# Patient Record
Sex: FEMALE | Race: WHITE | URBAN - METROPOLITAN AREA
[De-identification: names, ages, dates, MRNs, and addresses within clinical notes are randomized per-mention and may not be internally consistent; named-entity substitution may affect disease eponyms.]

---

## 2017-02-03 ENCOUNTER — HISTORICAL (OUTPATIENT)
Dept: ENDOSCOPY | Facility: HOSPITAL | Age: 57
End: 2017-02-03

## 2017-03-07 ENCOUNTER — HISTORICAL (OUTPATIENT)
Dept: FAMILY MEDICINE | Facility: CLINIC | Age: 57
End: 2017-03-07

## 2017-12-18 ENCOUNTER — HOSPITAL ENCOUNTER (OUTPATIENT)
Dept: INTENSIVE CARE | Facility: HOSPITAL | Age: 57
End: 2017-12-19
Attending: INTERNAL MEDICINE | Admitting: INTERNAL MEDICINE

## 2017-12-18 LAB
ABS NEUT (OLG): 4.38 X10(3)/MCL (ref 2.1–9.2)
ALBUMIN SERPL-MCNC: 3.6 GM/DL (ref 3.4–5)
ALBUMIN/GLOB SERPL: 0.8 {RATIO}
ALP SERPL-CCNC: 111 UNIT/L (ref 38–126)
ALT SERPL-CCNC: 37 UNIT/L (ref 12–78)
AMMONIA PLAS-MSCNC: 89 MCMOL/L (ref 11–32)
AMPHET UR QL SCN: NORMAL
APAP SERPL-MCNC: <2 MCG/ML (ref 10–30)
APPEARANCE, UA: ABNORMAL
APTT PPP: 24.6 SECOND(S) (ref 24.8–36.9)
AST SERPL-CCNC: 140 UNIT/L (ref 15–37)
BACTERIA SPEC CULT: ABNORMAL /HPF
BARBITURATE SCN PRESENT UR: NORMAL
BASOPHILS # BLD AUTO: 0.1 X10(3)/MCL (ref 0–0.2)
BASOPHILS NFR BLD AUTO: 1 %
BENZODIAZ UR QL SCN: NORMAL
BILIRUB SERPL-MCNC: 2.2 MG/DL (ref 0.2–1)
BILIRUB UR QL STRIP: ABNORMAL
BILIRUBIN DIRECT+TOT PNL SERPL-MCNC: 0.9 MG/DL (ref 0–0.5)
BILIRUBIN DIRECT+TOT PNL SERPL-MCNC: 1.3 MG/DL (ref 0–0.8)
BUN SERPL-MCNC: 6 MG/DL (ref 7–18)
CALCIUM SERPL-MCNC: 8.5 MG/DL (ref 8.5–10.1)
CANNABINOIDS UR QL SCN: NORMAL
CHLORIDE SERPL-SCNC: 95 MMOL/L (ref 98–107)
CK SERPL-CCNC: 160 UNIT/L (ref 26–192)
CO2 SERPL-SCNC: 11 MMOL/L (ref 21–32)
COCAINE UR QL SCN: NORMAL
COLOR UR: ABNORMAL
CREAT SERPL-MCNC: 0.96 MG/DL (ref 0.55–1.02)
EOSINOPHIL # BLD AUTO: 0 X10(3)/MCL (ref 0–0.9)
EOSINOPHIL NFR BLD AUTO: 0 %
ERYTHROCYTE [DISTWIDTH] IN BLOOD BY AUTOMATED COUNT: 17.3 % (ref 11.5–17)
ETHANOL SERPL-MCNC: 242 MG/DL (ref 0–3)
GLOBULIN SER-MCNC: 4.6 GM/DL (ref 2.4–3.5)
GLUCOSE (UA): NEGATIVE
GLUCOSE SERPL-MCNC: 89 MG/DL (ref 74–106)
HCT VFR BLD AUTO: 35.3 % (ref 37–47)
HGB BLD-MCNC: 10.5 GM/DL (ref 12–16)
HGB UR QL STRIP: ABNORMAL
INR PPP: 1.25 (ref 0–1.27)
KETONES UR QL STRIP: ABNORMAL
LEUKOCYTE ESTERASE UR QL STRIP: NEGATIVE
LYMPHOCYTES # BLD AUTO: 0.7 X10(3)/MCL (ref 0.6–4.6)
LYMPHOCYTES NFR BLD AUTO: 12 %
MCH RBC QN AUTO: 28.1 PG (ref 27–31)
MCHC RBC AUTO-ENTMCNC: 29.7 GM/DL (ref 33–36)
MCV RBC AUTO: 94.4 FL (ref 80–94)
MONOCYTES # BLD AUTO: 0.6 X10(3)/MCL (ref 0.1–1.3)
MONOCYTES NFR BLD AUTO: 10 %
NEUTROPHILS # BLD AUTO: 4.38 X10(3)/MCL (ref 1.4–7.9)
NEUTROPHILS NFR BLD AUTO: 76 %
NITRITE UR QL STRIP: NEGATIVE
OPIATES UR QL SCN: NORMAL
PCP UR QL: NORMAL
PH UR STRIP.AUTO: 6 [PH] (ref 5–7.5)
PH UR STRIP: 6 [PH] (ref 5–9)
PLATELET # BLD AUTO: 164 X10(3)/MCL (ref 130–400)
PMV BLD AUTO: 11.3 FL (ref 9.4–12.4)
POC TROPONIN: 0 NG/ML (ref 0–0.08)
POTASSIUM SERPL-SCNC: 4 MMOL/L (ref 3.5–5.1)
PROT SERPL-MCNC: 8.2 GM/DL (ref 6.4–8.2)
PROT UR QL STRIP: ABNORMAL
PROTHROMBIN TIME: 16.1 SECOND(S) (ref 12.2–14.7)
RBC # BLD AUTO: 3.74 X10(6)/MCL (ref 4.2–5.4)
RBC #/AREA URNS HPF: 15 /HPF (ref 0–2)
SALICYLATES SERPL-MCNC: 1.7 MG/DL (ref 2.8–20)
SODIUM SERPL-SCNC: 134 MMOL/L (ref 136–145)
SP GR FLD REFRACTOMETRY: 1.02 (ref 1–1.03)
SP GR UR STRIP: 1.02 (ref 1–1.03)
SQUAMOUS EPITHELIAL, UA: ABNORMAL
UROBILINOGEN UR STRIP-ACNC: 1
WBC # SPEC AUTO: 5.8 X10(3)/MCL (ref 4.5–11.5)
WBC #/AREA URNS HPF: 7 /HPF (ref 0–3)

## 2017-12-19 LAB
ABS NEUT (OLG): 3.91 X10(3)/MCL (ref 2.1–9.2)
APTT PPP: 29.9 SECOND(S) (ref 24.8–36.9)
BASOPHILS # BLD AUTO: 0 X10(3)/MCL (ref 0–0.2)
BASOPHILS NFR BLD AUTO: 1 %
BUN SERPL-MCNC: 3 MG/DL (ref 7–18)
CALCIUM SERPL-MCNC: 7.7 MG/DL (ref 8.5–10.1)
CHLORIDE SERPL-SCNC: 103 MMOL/L (ref 98–107)
CO2 SERPL-SCNC: 16 MMOL/L (ref 21–32)
CREAT SERPL-MCNC: 0.7 MG/DL (ref 0.55–1.02)
EOSINOPHIL # BLD AUTO: 0 X10(3)/MCL (ref 0–0.9)
EOSINOPHIL NFR BLD AUTO: 0 %
ERYTHROCYTE [DISTWIDTH] IN BLOOD BY AUTOMATED COUNT: 17.4 % (ref 11.5–17)
GLUCOSE SERPL-MCNC: 75 MG/DL (ref 74–106)
HCT VFR BLD AUTO: 27.1 % (ref 37–47)
HGB BLD-MCNC: 8.3 GM/DL (ref 12–16)
INR PPP: 1.31 (ref 0–1.27)
LYMPHOCYTES # BLD AUTO: 1.2 X10(3)/MCL (ref 0.6–4.6)
LYMPHOCYTES NFR BLD AUTO: 19 %
MCH RBC QN AUTO: 28.4 PG (ref 27–31)
MCHC RBC AUTO-ENTMCNC: 30.6 GM/DL (ref 33–36)
MCV RBC AUTO: 92.8 FL (ref 80–94)
MONOCYTES # BLD AUTO: 0.8 X10(3)/MCL (ref 0.1–1.3)
MONOCYTES NFR BLD AUTO: 13 %
NEUTROPHILS # BLD AUTO: 3.91 X10(3)/MCL (ref 2.1–9.2)
NEUTROPHILS NFR BLD AUTO: 66 %
PLATELET # BLD AUTO: 112 X10(3)/MCL (ref 130–400)
PMV BLD AUTO: 10.5 FL (ref 9.4–12.4)
POTASSIUM SERPL-SCNC: 3 MMOL/L (ref 3.5–5.1)
PROTHROMBIN TIME: 16.7 SECOND(S) (ref 12.2–14.7)
RBC # BLD AUTO: 2.92 X10(6)/MCL (ref 4.2–5.4)
SODIUM SERPL-SCNC: 136 MMOL/L (ref 136–145)
WBC # SPEC AUTO: 6 X10(3)/MCL (ref 4.5–11.5)

## 2018-01-30 ENCOUNTER — HISTORICAL (OUTPATIENT)
Dept: FAMILY MEDICINE | Facility: CLINIC | Age: 58
End: 2018-01-30

## 2018-01-30 LAB
AMPHET UR QL SCN: NEGATIVE
BARBITURATE SCN PRESENT UR: NEGATIVE
BENZODIAZ UR QL SCN: NEGATIVE
CANNABINOIDS UR QL SCN: NEGATIVE
COCAINE UR QL SCN: NEGATIVE
ETHANOL SERPL-MCNC: <3 MG/DL
OPIATES UR QL SCN: NEGATIVE
PCP UR QL: NEGATIVE
PH UR STRIP.AUTO: 6 [PH] (ref 5–8)
TEMPERATURE, URINE (OHS): 21.4 DEGC (ref 20–25)

## 2018-02-01 ENCOUNTER — HISTORICAL (OUTPATIENT)
Dept: ADMINISTRATIVE | Facility: HOSPITAL | Age: 58
End: 2018-02-01

## 2018-02-01 LAB
ABS NEUT (OLG): 3.17 X10(3)/MCL (ref 2.1–9.2)
ALBUMIN SERPL-MCNC: 3.8 GM/DL (ref 3.4–5)
ALBUMIN/GLOB SERPL: 1 RATIO (ref 1–2)
ALP SERPL-CCNC: 73 UNIT/L (ref 45–117)
ALT SERPL-CCNC: 23 UNIT/L (ref 12–78)
AST SERPL-CCNC: 30 UNIT/L (ref 15–37)
BASOPHILS # BLD AUTO: 0.09 X10(3)/MCL
BASOPHILS NFR BLD AUTO: 1 % (ref 0–1)
BILIRUB SERPL-MCNC: 0.6 MG/DL (ref 0.2–1)
BILIRUBIN DIRECT+TOT PNL SERPL-MCNC: 0.3 MG/DL
BILIRUBIN DIRECT+TOT PNL SERPL-MCNC: 0.3 MG/DL
BUN SERPL-MCNC: 9 MG/DL (ref 7–18)
CALCIUM SERPL-MCNC: 9.3 MG/DL (ref 8.5–10.1)
CHLORIDE SERPL-SCNC: 106 MMOL/L (ref 98–107)
CHOLEST SERPL-MCNC: 176 MG/DL
CHOLEST/HDLC SERPL: 2.6 {RATIO} (ref 0–4.4)
CO2 SERPL-SCNC: 28 MMOL/L (ref 21–32)
CREAT SERPL-MCNC: 0.8 MG/DL (ref 0.6–1.3)
DEPRECATED CALCIDIOL+CALCIFEROL SERPL-MC: 30.01 NG/ML (ref 30–80)
EOSINOPHIL # BLD AUTO: 0.29 X10(3)/MCL
EOSINOPHIL NFR BLD AUTO: 4 % (ref 0–5)
ERYTHROCYTE [DISTWIDTH] IN BLOOD BY AUTOMATED COUNT: 19.2 % (ref 11.5–14.5)
EST. AVERAGE GLUCOSE BLD GHB EST-MCNC: 128 MG/DL
FERRITIN SERPL-MCNC: 16.4 NG/ML (ref 8–388)
FOLATE SERPL-MCNC: 46.5 NG/ML (ref 3.1–17.5)
GLOBULIN SER-MCNC: 4.7 GM/ML (ref 2.3–3.5)
GLUCOSE SERPL-MCNC: 96 MG/DL (ref 74–106)
HBA1C MFR BLD: 6.1 % (ref 4.2–6.3)
HCT VFR BLD AUTO: 39 % (ref 35–46)
HDLC SERPL-MCNC: 68 MG/DL
HGB BLD-MCNC: 12.4 GM/DL (ref 12–16)
IMM GRANULOCYTES # BLD AUTO: 0.01 10*3/UL
IMM GRANULOCYTES NFR BLD AUTO: 0 %
INR PPP: 1.24 (ref 0.9–1.2)
IRON SATN MFR SERPL: 21 % (ref 15–50)
IRON SERPL-MCNC: 104 MCG/DL (ref 50–170)
LDLC SERPL CALC-MCNC: 96 MG/DL (ref 0–130)
LYMPHOCYTES # BLD AUTO: 2.28 X10(3)/MCL
LYMPHOCYTES NFR BLD AUTO: 35 % (ref 15–40)
MCH RBC QN AUTO: 27 PG (ref 26–34)
MCHC RBC AUTO-ENTMCNC: 31.8 GM/DL (ref 31–37)
MCV RBC AUTO: 85 FL (ref 80–100)
MONOCYTES # BLD AUTO: 0.69 X10(3)/MCL
MONOCYTES NFR BLD AUTO: 11 % (ref 4–12)
NEUTROPHILS # BLD AUTO: 3.17 X10(3)/MCL
NEUTROPHILS NFR BLD AUTO: 48 X10(3)/MCL
PLATELET # BLD AUTO: 234 X10(3)/MCL (ref 130–400)
PMV BLD AUTO: 11.9 FL (ref 7.4–10.4)
POTASSIUM SERPL-SCNC: 4.6 MMOL/L (ref 3.5–5.1)
PROT SERPL-MCNC: 8.5 GM/DL (ref 6.4–8.2)
PROTHROMBIN TIME: 14.8 SECOND(S) (ref 11.9–14.4)
RBC # BLD AUTO: 4.59 X10(6)/MCL (ref 4–5.2)
SODIUM SERPL-SCNC: 141 MMOL/L (ref 136–145)
TIBC SERPL-MCNC: 495 MCG/DL (ref 250–450)
TRANSFERRIN SERPL-MCNC: 365 MG/DL (ref 200–360)
TRIGL SERPL-MCNC: 59 MG/DL
TSH SERPL-ACNC: 2.42 MIU/L (ref 0.36–3.74)
VIT B12 SERPL-MCNC: 463 PG/ML (ref 193–986)
VLDLC SERPL CALC-MCNC: 12 MG/DL
WBC # SPEC AUTO: 6.5 X10(3)/MCL (ref 4.5–11)

## 2018-05-07 ENCOUNTER — HISTORICAL (OUTPATIENT)
Dept: FAMILY MEDICINE | Facility: CLINIC | Age: 58
End: 2018-05-07

## 2018-05-07 LAB
ABS NEUT (OLG): 4.18 X10(3)/MCL (ref 2.1–9.2)
ALBUMIN SERPL-MCNC: 3.7 GM/DL (ref 3.4–5)
ALBUMIN/GLOB SERPL: 1 RATIO (ref 1–2)
ALP SERPL-CCNC: 77 UNIT/L (ref 45–117)
ALT SERPL-CCNC: 20 UNIT/L (ref 12–78)
AST SERPL-CCNC: 19 UNIT/L (ref 15–37)
BASOPHILS # BLD AUTO: 0.11 X10(3)/MCL
BASOPHILS NFR BLD AUTO: 2 %
BILIRUB SERPL-MCNC: 0.5 MG/DL (ref 0.2–1)
BILIRUBIN DIRECT+TOT PNL SERPL-MCNC: 0.2 MG/DL
BILIRUBIN DIRECT+TOT PNL SERPL-MCNC: 0.3 MG/DL
BUN SERPL-MCNC: 7 MG/DL (ref 7–18)
CALCIUM SERPL-MCNC: 8.9 MG/DL (ref 8.5–10.1)
CHLORIDE SERPL-SCNC: 108 MMOL/L (ref 98–107)
CHOLEST SERPL-MCNC: 222 MG/DL
CHOLEST/HDLC SERPL: 4.5 {RATIO} (ref 0–4.4)
CO2 SERPL-SCNC: 27 MMOL/L (ref 21–32)
CREAT SERPL-MCNC: 0.9 MG/DL (ref 0.6–1.3)
DEPRECATED CALCIDIOL+CALCIFEROL SERPL-MC: 24.24 NG/ML (ref 30–80)
EOSINOPHIL # BLD AUTO: 0.22 X10(3)/MCL
EOSINOPHIL NFR BLD AUTO: 3 %
ERYTHROCYTE [DISTWIDTH] IN BLOOD BY AUTOMATED COUNT: 17.4 % (ref 11.5–14.5)
EST. AVERAGE GLUCOSE BLD GHB EST-MCNC: 126 MG/DL
FERRITIN SERPL-MCNC: 7.7 NG/ML (ref 8–388)
FOLATE SERPL-MCNC: 19.2 NG/ML (ref 3.1–17.5)
GLOBULIN SER-MCNC: 4.4 GM/ML (ref 2.3–3.5)
GLUCOSE SERPL-MCNC: 100 MG/DL (ref 74–106)
HBA1C MFR BLD: 6 % (ref 4.2–6.3)
HCT VFR BLD AUTO: 41 % (ref 35–46)
HDLC SERPL-MCNC: 49 MG/DL
HGB BLD-MCNC: 12.8 GM/DL (ref 12–16)
IMM GRANULOCYTES # BLD AUTO: 0.02 10*3/UL
IMM GRANULOCYTES NFR BLD AUTO: 0 %
INR PPP: 1.12 (ref 0.9–1.2)
IRON SATN MFR SERPL: 10.8 % (ref 15–50)
IRON SERPL-MCNC: 51 MCG/DL (ref 50–170)
LDLC SERPL CALC-MCNC: 146 MG/DL (ref 0–130)
LYMPHOCYTES # BLD AUTO: 2.02 X10(3)/MCL
LYMPHOCYTES NFR BLD AUTO: 28 % (ref 13–40)
MCH RBC QN AUTO: 27.7 PG (ref 26–34)
MCHC RBC AUTO-ENTMCNC: 31.2 GM/DL (ref 31–37)
MCV RBC AUTO: 88.7 FL (ref 80–100)
MONOCYTES # BLD AUTO: 0.74 X10(3)/MCL
MONOCYTES NFR BLD AUTO: 10 % (ref 4–12)
NEUTROPHILS # BLD AUTO: 4.18 X10(3)/MCL
NEUTROPHILS NFR BLD AUTO: 57 X10(3)/MCL
PLATELET # BLD AUTO: 201 X10(3)/MCL (ref 130–400)
PMV BLD AUTO: 11.2 FL (ref 7.4–10.4)
POTASSIUM SERPL-SCNC: 4.6 MMOL/L (ref 3.5–5.1)
PROT SERPL-MCNC: 8.1 GM/DL (ref 6.4–8.2)
PROTHROMBIN TIME: 13.7 SECOND(S) (ref 11.9–14.4)
RBC # BLD AUTO: 4.62 X10(6)/MCL (ref 4–5.2)
SODIUM SERPL-SCNC: 140 MMOL/L (ref 136–145)
TIBC SERPL-MCNC: 472 MCG/DL (ref 250–450)
TRANSFERRIN SERPL-MCNC: 360 MG/DL (ref 200–360)
TRIGL SERPL-MCNC: 133 MG/DL
TSH SERPL-ACNC: 2.02 MIU/L (ref 0.36–3.74)
VIT B12 SERPL-MCNC: 294 PG/ML (ref 193–986)
VLDLC SERPL CALC-MCNC: 27 MG/DL
WBC # SPEC AUTO: 7.3 X10(3)/MCL (ref 4.5–11)

## 2018-05-11 ENCOUNTER — HISTORICAL (OUTPATIENT)
Dept: ENDOSCOPY | Facility: HOSPITAL | Age: 58
End: 2018-05-11

## 2018-05-14 ENCOUNTER — HISTORICAL (OUTPATIENT)
Dept: ADMINISTRATIVE | Facility: HOSPITAL | Age: 58
End: 2018-05-14

## 2018-05-16 LAB
HIGH RISK HPV 16 (PRECISION): NEGATIVE
HIGH RISK HPV 18/45 (PRECISION): NEGATIVE
PAP RECOMMENDATION EXT: NORMAL
PAP SMEAR: NORMAL

## 2018-06-01 ENCOUNTER — HISTORICAL (OUTPATIENT)
Dept: ENDOSCOPY | Facility: HOSPITAL | Age: 58
End: 2018-06-01

## 2018-09-20 ENCOUNTER — HISTORICAL (OUTPATIENT)
Dept: FAMILY MEDICINE | Facility: CLINIC | Age: 58
End: 2018-09-20

## 2019-03-06 ENCOUNTER — HISTORICAL (OUTPATIENT)
Dept: ADMINISTRATIVE | Facility: HOSPITAL | Age: 59
End: 2019-03-06

## 2019-03-19 ENCOUNTER — HISTORICAL (OUTPATIENT)
Dept: FAMILY MEDICINE | Facility: CLINIC | Age: 59
End: 2019-03-19

## 2019-05-29 ENCOUNTER — HISTORICAL (OUTPATIENT)
Dept: ADMINISTRATIVE | Facility: HOSPITAL | Age: 59
End: 2019-05-29

## 2019-05-29 LAB — TSH SERPL-ACNC: 2.23 MIU/L (ref 0.36–3.74)

## 2019-09-26 ENCOUNTER — HISTORICAL (OUTPATIENT)
Dept: RADIOLOGY | Facility: HOSPITAL | Age: 59
End: 2019-09-26

## 2019-12-10 ENCOUNTER — HISTORICAL (OUTPATIENT)
Dept: RADIOLOGY | Facility: HOSPITAL | Age: 59
End: 2019-12-10

## 2021-01-21 LAB — NONINV COLON CA DNA+OCC BLD SCRN STL QL: NORMAL

## 2021-03-16 ENCOUNTER — HISTORICAL (OUTPATIENT)
Dept: RADIOLOGY | Facility: HOSPITAL | Age: 61
End: 2021-03-16

## 2021-03-16 LAB — BCS RECOMMENDATION EXT: NORMAL

## 2021-04-21 ENCOUNTER — HISTORICAL (OUTPATIENT)
Dept: ADMINISTRATIVE | Facility: HOSPITAL | Age: 61
End: 2021-04-21

## 2021-04-21 LAB
ABS NEUT (OLG): 3.49 X10(3)/MCL (ref 2.1–9.2)
ALBUMIN SERPL-MCNC: 4 GM/DL (ref 3.5–5)
ALBUMIN/GLOB SERPL: 1.1 RATIO (ref 1.1–2)
ALP SERPL-CCNC: 92 UNIT/L (ref 40–150)
ALT SERPL-CCNC: 20 UNIT/L (ref 0–55)
APTT PPP: 32.4 SECOND(S) (ref 23.3–37)
AST SERPL-CCNC: 21 UNIT/L (ref 5–34)
BASOPHILS # BLD AUTO: 0.1 X10(3)/MCL (ref 0–0.2)
BASOPHILS NFR BLD AUTO: 1 %
BILIRUB SERPL-MCNC: 0.8 MG/DL
BILIRUBIN DIRECT+TOT PNL SERPL-MCNC: 0.3 MG/DL (ref 0–0.5)
BILIRUBIN DIRECT+TOT PNL SERPL-MCNC: 0.5 MG/DL (ref 0–0.8)
BUN SERPL-MCNC: 3.8 MG/DL (ref 9.8–20.1)
CALCIUM SERPL-MCNC: 9.7 MG/DL (ref 8.4–10.2)
CHLORIDE SERPL-SCNC: 105 MMOL/L (ref 98–107)
CHOLEST SERPL-MCNC: 190 MG/DL
CHOLEST/HDLC SERPL: 5 {RATIO} (ref 0–5)
CO2 SERPL-SCNC: 26 MMOL/L (ref 22–29)
CREAT SERPL-MCNC: 0.74 MG/DL (ref 0.55–1.02)
EOSINOPHIL # BLD AUTO: 0.2 X10(3)/MCL (ref 0–0.9)
EOSINOPHIL NFR BLD AUTO: 3 %
ERYTHROCYTE [DISTWIDTH] IN BLOOD BY AUTOMATED COUNT: 13.3 % (ref 11.5–14.5)
EST. AVERAGE GLUCOSE BLD GHB EST-MCNC: 105.4 MG/DL
GLOBULIN SER-MCNC: 3.7 GM/DL (ref 2.4–3.5)
GLUCOSE SERPL-MCNC: 76 MG/DL (ref 74–100)
HBA1C MFR BLD: 5.3 %
HCT VFR BLD AUTO: 50 % (ref 35–46)
HDLC SERPL-MCNC: 40 MG/DL (ref 35–60)
HGB BLD-MCNC: 16.1 GM/DL (ref 12–16)
IMM GRANULOCYTES # BLD AUTO: 0.02 10*3/UL
IMM GRANULOCYTES NFR BLD AUTO: 0 %
INR PPP: 1.11 (ref 0.9–1.2)
LDLC SERPL CALC-MCNC: 126 MG/DL (ref 50–140)
LYMPHOCYTES # BLD AUTO: 2.5 X10(3)/MCL (ref 0.6–4.6)
LYMPHOCYTES NFR BLD AUTO: 36 %
MCH RBC QN AUTO: 31.6 PG (ref 26–34)
MCHC RBC AUTO-ENTMCNC: 32.2 GM/DL (ref 31–37)
MCV RBC AUTO: 98 FL (ref 80–100)
MONOCYTES # BLD AUTO: 0.7 X10(3)/MCL (ref 0.1–1.3)
MONOCYTES NFR BLD AUTO: 10 %
NEUTROPHILS # BLD AUTO: 3.49 X10(3)/MCL (ref 2.1–9.2)
NEUTROPHILS NFR BLD AUTO: 50 %
PLATELET # BLD AUTO: 169 X10(3)/MCL (ref 130–400)
PMV BLD AUTO: 12.3 FL (ref 7.4–10.4)
POTASSIUM SERPL-SCNC: 4.7 MMOL/L (ref 3.5–5.1)
PROT SERPL-MCNC: 7.7 GM/DL (ref 6.4–8.3)
PROTHROMBIN TIME: 13.9 SECOND(S) (ref 11.9–14.4)
RBC # BLD AUTO: 5.1 X10(6)/MCL (ref 4–5.2)
SODIUM SERPL-SCNC: 141 MMOL/L (ref 136–145)
TRIGL SERPL-MCNC: 118 MG/DL (ref 37–140)
VLDLC SERPL CALC-MCNC: 24 MG/DL
WBC # SPEC AUTO: 7 X10(3)/MCL (ref 4.5–11)

## 2021-08-12 ENCOUNTER — HISTORICAL (OUTPATIENT)
Dept: RADIOLOGY | Facility: HOSPITAL | Age: 61
End: 2021-08-12

## 2021-10-01 ENCOUNTER — HISTORICAL (OUTPATIENT)
Dept: ADMINISTRATIVE | Facility: HOSPITAL | Age: 61
End: 2021-10-01

## 2021-10-01 LAB — TSH SERPL-ACNC: 3.43 UIU/ML (ref 0.35–4.94)

## 2021-11-26 ENCOUNTER — HISTORICAL (OUTPATIENT)
Dept: ADMINISTRATIVE | Facility: HOSPITAL | Age: 61
End: 2021-11-26

## 2022-04-10 ENCOUNTER — HISTORICAL (OUTPATIENT)
Dept: ADMINISTRATIVE | Facility: HOSPITAL | Age: 62
End: 2022-04-10
Payer: MEDICARE

## 2022-04-30 VITALS
SYSTOLIC BLOOD PRESSURE: 104 MMHG | HEIGHT: 65 IN | SYSTOLIC BLOOD PRESSURE: 132 MMHG | BODY MASS INDEX: 31.7 KG/M2 | DIASTOLIC BLOOD PRESSURE: 55 MMHG | WEIGHT: 179 LBS | BODY MASS INDEX: 29.82 KG/M2 | WEIGHT: 190.25 LBS | HEIGHT: 65 IN | BODY MASS INDEX: 31.7 KG/M2 | WEIGHT: 190.25 LBS | SYSTOLIC BLOOD PRESSURE: 109 MMHG | DIASTOLIC BLOOD PRESSURE: 67 MMHG | OXYGEN SATURATION: 98 % | OXYGEN SATURATION: 97 % | DIASTOLIC BLOOD PRESSURE: 78 MMHG | HEIGHT: 65 IN

## 2022-05-02 NOTE — HISTORICAL [facility] CERNER
This is a historical note converted from [employer]. Formatting and pictures may have been removed.  Please reference Katherine for original formatting and attached multimedia. Chief Complaint  Paracentesis  History of Present Illness  57-year-old  female with a history of cirrhotic liver disease secondary alcohol abuse?comes to procedure clinic?this afternoon for paracentesis.? Under ultrasound guidance,?the peritoneal cavity?was with?limited fluid available?for paracentesis.??The abdomen was soft, no fluid wave present. Patient was with continuous?cardiac monitoring?and remained hemodynamic stable during the assessment. ?Patient denied?chest pain, shortness of breath, fever, sweats or chills.  Review of Systems  CONSTITUTIONAL: No weight loss, fever, sweats, chills, weakness or fatigue.?  HEENT: Eyes:?No visual changes, no blurred vision, double vision or yellow sclerae. Ears, Nose, Throat: No hearing loss, sneezing, congestion, runny nose or sore throat.?  SKIN: No rash or itching.?  CARDIOVASCULAR: No chest pain, chest pressure or chest discomfort. No palpitations or edema.?  RESPIRATORY: No shortness of breath, no cough or sputum.?  GASTROINTESTINAL: No anorexia, nausea, vomiting or diarrhea. No abdominal pain or blood.?  NEUROLOGICAL: No headache, dizziness, syncope, paralysis, ataxia, numbness or tingling in the extremities. No change in bowel or bladder control.?  MUSCULOSKELETAL: No muscle, back pain, joint pain or stiffness.?  HEMATOLOGIC: No anemia, bleeding or bruising.?  LYMPHATICS: No enlarged nodes. No history of splenectomy.?  PSYCHIATRIC: No depression or anxiety.?  ENDOCRINOLOGIC: No reports of sweating, cold or heat intolerance. No polyuria or polydipsia.?  ALLERGIES: No history of asthma, hives, eczema or rhinitis.  Physical Exam  Vitals & Measurements  T:?36.9? ?C (Oral)? HR:?87(Monitored)? RR:?18? BP:?124/78? SpO2:?98%?  HT:?165.1?cm? WT:?82.2?kg?  GENERAL: AAOx3, NAD, Afebrile  HEENT: NCAT,  PERRLA, EOMI, Septum Midline, No Erythema or Pharyngeal Exudates, Good Dentition???  NECK: Supple,?No Thyromegaly  HEART: +S1/S2, RRR, No MRG  LUNGS: CTAB, No WRC, Chest Wall Expansion Equal Bilaterally  ABDOMEN: Soft, NT, +Distention,?ABSx4, No Fluid Wave  EXTREMITIES: No CCE, Pulse Intact Bilaterally, +Skin Turgor?with Good Recoil, Dry ?  NEUROLOGICAL: CN II-VII Grossly Intact, Strength 5/5 In All Extremities, DTRs Intact Bilaterally, Sensation to Soft Touch Intact Bilaterally  Assessment/Plan  ?  1. Cirrhotic Liver Disease 2/2 Alcohol Abuse  -Managed with Spironolactone 100mg QD  -Remains abstinent from alcohol.  -Paracentesis Today: limited peritoneal fluid for paracentesis at this time.  -Recommendation: Report to ED should pt experience SOB or difficulty breathing. F/u with PCP for further management. ?  ?  ?   Problem List/Past Medical History  Ongoing  Aortic valve disorders(  Confirmed  )  Cirrhosis of liver(  Confirmed  )  Dystonia  ETOH abuse  Fibromyalgia  HLD - Hyperlipidemia  Hypothyroid  Neuropathy  Osteoporosis  Portal hypertension(  Confirmed  )  Tobacco abuse  Torticollis  Weakness  Historical  No qualifying data  Procedure/Surgical History  Bleeder Control Gastrointestinal (11/14/2017), Esophagogastroduodenoscopy (11/14/2017), Occlusion of Esophageal Vein with Extraluminal Device, Via Natural or Artificial Opening Endoscopic (11/14/2017), Drainage of Peritoneal Cavity with Drainage Device, Percutaneous Approach (02/03/2017), Paracentesis (02/03/2017), Peritoneal lavage, including imaging guidance, when performed (02/03/2017), Tonsillectomy (1965), Ganglionectomy of tendon sheath of wrist.  Medications  Centrum Womens oral tablet, Oral, Daily  Cymbalta 30 mg oral delayed release capsule, 30 mg= 1 cap(s), Oral, BID, 3 refills  gabapentin 600 mg oral tablet, 600 mg= 1 tab(s), Oral, Daily, 6 refills  Hair, Skin & Nails, 5 mg, Oral, Daily  Home Health- [facility], See  Instructions  levothyroxine 50 mcg (0.05 mg) oral tablet, 50 mcg= 1 tab(s), Oral, Daily, 3 refills  QUEtiapine 100 mg oral tablet, 200 mg= 2 tab(s), Oral, At Bedtime, 3 refills  spironolactone 100 mg oral tablet, 100 mg= 1 tab(s), Oral, Daily, 3 refills  traZODONE 50 mg oral tablet ( Desyrel ), 50 mg= 1 tab(s), Oral, qPM, 3 refills  Vitamin D3 5000 intl units oral tablet, 5000 IntUnit= 1 tab(s), Oral, Daily, 2 refills  Zofran ODT 8 mg oral tablet, disintegrating, 8 mg tabs, Oral, q6hr, PRN, 1 refills  Zyrtec 10 mg oral tablet, 10 mg= 1 tab(s), Oral, Daily, 1 refills  Allergies  erythromycin  potassium chloride?(Nausea)  zinc sulfate?(Nausea)  Social History  Alcohol - Denies Alcohol Use, 08/03/2013  Past, Liquor, Several times per day, 06/01/2018  Nutrition/Health  Regular, 04/20/2015  Substance Abuse - Denies Substance Abuse, 08/03/2013  Never, 07/25/2017  Tobacco - High Risk, 08/03/2013  Cigarettes, 08/03/2013  Family History  Diabetes mellitus type 1.: Negative: Father.  Diabetes mellitus type 2: Father.  Hypertension.: Mother.  Immunizations  Vaccine Date Status   influenza virus vaccine, inactivated 09/27/2017 Given   influenza virus vaccine, inactivated 10/04/2016 Given   tetanus/diphtheria/pertussis, acel(Tdap) 09/09/2016 Given   influenza virus vaccine, inactivated 11/02/2015 Given       I saw the patient with the resident and discussed the case, assisted with the development of the assessment and agree with the plan of care.? Pt was scheduled today for procedure clinic for paracentesis.? Chart suggests pt has alcoholic cirrhosis w/ ascites w/ last paracentesis sometime in the last 6 months or longer.? Pt relates came to procedure clinic last month and was stable w/o significant abdominal fluid so procedure was abandoned.? Today she is clinically stable w/o any progression in ascitic fluid or symptoms.? Canceled procedure and recommend she continue medical mgmt.? At d/c, verified pt is followed in [facility]  clinic and asked nurses to work w/ schedulers to bump up primary care f/u to sometime w/i the next 4-8 weeks rather than keep scheduling pt in procedure clinic given her stability.? 15 minutes or more were spent in pt care for this visit.

## 2022-05-02 NOTE — HISTORICAL [facility] CERNER
This is a historical note converted from [employer]ern[employer]. Formatting and pictures may have been removed.  Please reference Katherine for original formatting and attached multimedia. Chief Complaint  follow up Cirrhosis, IGT, Lower Back Pain  History of Present Illness  57yoWF here for follow up  ?  Hearing loss- seen by Audiology - not interested in hearing aids or allergy testing  Needs colonoscopy  Needs Mammogram  Needs PAP Smear- more than 5 years ago, LMP 50yoa  ?  Seen by Chiropractor due to Lower Back pain - Hx of Central Canal Stenosis - requesting MRI- has not ?  Unable to complete physical therapy due to pain  No history of trauma  Lower Back pain improved with rest - worsened with with activity  No loss of sensation/tingling/numbness no bowel or bladder incontinence  ?  IGT working on diet and exercise- not eating as much sugar- drinking less soda A1C 6.1 --> 6.0  Cirrhosis- Feels ?like her stomach has been more distended- was seen in procedure clinic last Friday- no definitive fluid pocket will go back on June 1st for possible Paracentesis, is not drinking any alcohol??- compliant with Spironolactone.  Review of Systems  Negative except those mentioned in HPI.  Physical Exam  Vitals & Measurements  T:?36.8? ?C (Oral)? HR:?88(Peripheral)? RR:?20? BP:?109/55? SpO2:?98%? WT:?81.20?kg? WT:?81.20?kg?  GENERAL: NAD,?smells of smoke, appears older than stated age  HEENT: Normocephalic, atraumatic,?EOMI, no scleral icterus,?tympanic membranes clear bilaterally, boggy nasal mucosa,?mild pharyngeal erythema  NECK: Supple, no adenopathy, no thyromegaly.  Respiratory:?Mild expiratory wheezing bilaterally,?no rales or rhonchi, no peripheral cyanosis, nonlabored respiratory rate on my exam?16br/min  Cardio:?Tachycardic, regular rhythm, S1S2 positive, no JVD  ABD: Soft, non-tender,? bowel sounds present, mild distention,?scars noted from previous paracentesis  Genitalia:  - External genitalia: Normal without lesions  - Urethral  meatus: Normal  - Bladder: No suprapubic tenderness  - Vaginal/pelvic support: Normal, moist vaginal mucosa without lesions. No blood. Adequate capacity. Good descent.  - Cervix: No CMT, no lesions.  Back Exam: Limited flexion and extension due to pain, antalgic gait, normal sensation bilaterally, no deformity  EXTREMITIES: peripheral pulses 2+, no peripheral edema  SKIN: Warm, dry, no jaundice.  NEURO: AAOx3, no focal deficits,?speech is clear and coherent, no focal deficits.?  PSYCHIATRIC: Cooperative, appropriate mood and affect.  Assessment/Plan  Cirrhosis  continue Spironolactone 100mg QD  congratulated on continued Alcohol abstinence  Paracentesis June 1st as scheduled  ?  Colon cancer screening  ?Colonoscopy ordered today  Ordered:  Internal Referral to Gastroenterology, screening colonoscopy, *Est. 06/13/18 3:00:00 CDT, Future Visit?, Colon cancer screening  ?  Health care maintenance  Mammogram ordered today  PAP performed today  Tdap 2016  ?  IGT (impaired glucose tolerance)  ?continue diet modification and exercise  ?  Spinal stenosis  ?unable to perform PT due to pain  will obtain XR and MRI if indicated - consider Neuro referral based on results  continue to follow up with Chiropractor as scheduled  ?  RTC 3 months, sooner if needed   Problem List/Past Medical History  Ongoing  Aortic valve disorders(  Confirmed  )  Cirrhosis of liver(  Confirmed  )  Dystonia  ETOH abuse  Fibromyalgia  HLD - Hyperlipidemia  Hypothyroid  Neuropathy  Osteoporosis  Portal hypertension(  Confirmed  )  Tobacco abuse  Torticollis  Weakness  Historical  No qualifying data  Procedure/Surgical History  Bleeder Control Gastrointestinal (11/14/2017), Esophagogastroduodenoscopy (11/14/2017), Occlusion of Esophageal Vein with Extraluminal Device, Via Natural or Artificial Opening Endoscopic (11/14/2017), Drainage of Peritoneal Cavity with Drainage Device, Percutaneous Approach (02/03/2017), Paracentesis (02/03/2017),  Peritoneal lavage, including imaging guidance, when performed (02/03/2017), Tonsillectomy (1965), Ganglionectomy of tendon sheath of wrist.  Medications  Inpatient  No active inpatient medications  Home  Centrum Womens oral tablet, Oral, Daily  Cymbalta 30 mg oral delayed release capsule, 30 mg= 1 cap(s), Oral, BID, 3 refills  Flonase 50 mcg/inh nasal spray, 2 spray(s), Nasal, Daily, 3 refills  gabapentin 600 mg oral tablet, 600 mg= 1 tab(s), Oral, Daily, 6 refills  Hair, Skin & Nails, 5 mg, Oral, Daily  Home Health- [facility], See Instructions  levothyroxine 50 mcg (0.05 mg) oral tablet, 50 mcg= 1 tab(s), Oral, Daily, 3 refills  QUEtiapine 100 mg oral tablet, 200 mg= 2 tab(s), Oral, At Bedtime, 3 refills  spironolactone 100 mg oral tablet, 100 mg= 1 tab(s), Oral, Daily, 3 refills  traZODONE 50 mg oral tablet ( Desyrel ), 50 mg= 1 tab(s), Oral, qPM, 3 refills  Vitamin D3 5000 intl units oral tablet, 5000 IntUnit= 1 tab(s), Oral, Daily, 2 refills  Zofran ODT 8 mg oral tablet, disintegrating, 8 mg tabs, Oral, q6hr, PRN, 1 refills  Zyrtec 10 mg oral tablet, 10 mg= 1 tab(s), Oral, Daily, 1 refills  Allergies  erythromycin  potassium chloride?(Nausea)  zinc sulfate?(Nausea)  Social History  Alcohol - Denies Alcohol Use, 08/03/2013  Current, Liquor, Several times per day, 02/03/2017  Nutrition/Health  Regular, 04/20/2015  Substance Abuse - Denies Substance Abuse, 08/03/2013  Never, 07/25/2017  Tobacco - High Risk, 08/03/2013  Cigarettes, 08/03/2013  Family History  Diabetes mellitus type 1.: Negative: Father.  Diabetes mellitus type 2: Father.  Hypertension.: Mother.  Immunizations  Vaccine Date Status   influenza virus vaccine, inactivated 09/27/2017 Given   influenza virus vaccine, inactivated 10/04/2016 Given   tetanus/diphtheria/pertussis, acel(Tdap) 09/09/2016 Given   influenza virus vaccine, inactivated 11/02/2015 Given       HPI and PE reviewed with Resident. Assessment and treatment plan reasonable and  appropriate.

## 2022-05-02 NOTE — HISTORICAL [facility] CERNER
This is a historical note converted from [employer]ernancy. Formatting and pictures may have been removed.  Please reference Cernancy for original formatting and attached multimedia. Chief Complaint  6 WEEK F/U I&D IMN RIGHT TIBIA  History of Present Illness  Patient is?6 weeks s/p IMN right tibia and I & D open tibia fracture. Here today for x-rays and evaluation.?Doing well overall.  Review of Systems  Constitutional: negative except as stated in HPI  Eye: negative except as stated in HPI  ENMT: negative except as stated in HPI  Respiratory: negative except as stated in HPI  Cardiovascular: negative except as stated in HPI  Gastrointestinal: negative except as stated in HPI  Genitourinary: negative except as stated in HPI  Hema/Lymph: negative except as stated in HPI  Endocrine: negative except as stated in HPI  Immunologic: negative except as stated in HPI  Musculoskeletal: negative except as stated in HPI  Integumentary: negative except as stated in HPI  Neurologic: negative except as stated in HPI  ?   All Other ROS_ ?negative except as stated in HPI  Physical Exam  Vitals & Measurements  HR:?92(Peripheral)? RR:?20? BP:?104/67?  HT:?165.1?cm? WT:?86.3?kg? BMI:?31.66?  General-Alert, oriented, no fever, chills  [id number] well healed. ROM knee and ankle without significant pain. Moves all digits well. BCR all digits. DP pulse 2+  ?Neurologic-Alert and oriented x4, cooperative  ?Dermatologic-Skin warm, pink, dry.  Assessment/Plan  Displaced oblique fracture of shaft of right tibia, initial encounter for open fracture type I or II?S82.231B  Ordered:  Post-Op follow-up visit [id number] PC, Displaced oblique fracture of shaft of right tibia, initial encounter for open fracture type I or II, [facility], 03/06/19 14:07:00 CST  ?  Orders:  Clinic Follow-up PRN, 03/06/19 14:07:00 CST, Future Order, [facility]  ?  ?  Patient doing well. Continue WBAT RLE. Work on ROM, strengthening, and stretching  exercises RLE. She has no complaints and would like to be PRN since she states that she has a lot of medical bills. She will f/u on PRN basis for new/worsening orthopedic issues.   Problem List/Past Medical History  Ongoing  Anxiety  Aortic valve disorders(  Confirmed  )  Cirrhosis of liver(  Confirmed  )  Displaced oblique fracture of shaft of right tibia, initial encounter for open fracture type I or II  Dystonia  ETOH abuse  Fibromyalgia  Fibromyalgia  HLD - Hyperlipidemia  Hypothyroid  Neuropathy  Osteoporosis  Portal hypertension(  Confirmed  )  Tobacco abuse  Torticollis  Weakness  Historical  No qualifying data  Procedure/Surgical History  Incision & Drainage Lower Extremity (Right) (01/23/2019)  Intramedullary Ector Insertion Tibia (Right) (01/23/2019)  Bleeder Control Gastrointestinal (11/14/2017)  Esophagogastroduodenoscopy (11/14/2017)  Paracentesis (02/03/2017)  Tonsillectomy (1965)  Ganglionectomy of tendon sheath of wrist   Medications  ALPRAZOLAM TAB 1MG  AMOXICILLIN CAP 500MG, 500 mg= 1 cap(s), TID  AMOXICILLIN TAB 875MG, 875 mg= 1 tab(s), BID  aspirin 81 mg oral Delayed Release (EC) tablet, 81 mg= 1 tab(s), Oral, BID  baclofen 10 mg oral tablet, 10 mg= 1 tab(s), Oral, TID, 3 refills  Centrum Womens oral tablet, Oral, Daily,? ?Investigating  CHLORHEX GLU SOL 0.12%  Colace 100 mg oral capsule, 100 mg= 1 cap(s), Oral, BID,? ?Not taking: Last Dose Date/Time Unknown  CYCLOBENZAPR TAB 10MG  Cymbalta 30 mg oral delayed release capsule, 30 mg= 1 cap(s), Oral, BID, 3 refills,? ?Investigating  gabapentin 600 mg oral tablet, 600 mg= 1 tab(s), Oral, TID, 3 refills  Hair, Skin & Nails, 5 mg, Oral, Daily,? ?Investigating  Home Health- [facility], See Instructions,? ?Unable to obtain  HYDROCO/APAP TAB 10-325MG  IBU TAB 600MG  IBUPROFEN TAB 800MG, 800 mg= 1 tab(s), TID  levothyroxine 50 mcg (0.05 mg) oral tablet, 50 mcg= 1 tab(s), Oral, Daily, 3 refills,? ?Investigating  Ninjacof Liq,  q6-8hr  OXYCOD/APAP TAB 5-325MG  pravastatin 40 mg oral tablet, 40 mg= 1 tab(s), Oral, Daily, 3 refills,? ?Investigating  QUEtiapine 100 mg oral tablet, 200 mg= 2 tab(s), Oral, At Bedtime, 3 refills  spironolactone 100 mg oral tablet, 100 mg= 1 tab(s), Oral, Daily, 3 refills,? ?Investigating  Tramadol Hcl 50 Mg Tab!, q8hr  traZODONE 50 mg oral tablet ( Desyrel ), See Instructions, 1 refills  Vitamin D3 5000 intl units oral tablet, 5000 IntUnit= 1 tab(s), Oral, Daily, 3 refills,? ?Investigating  Allergies  erythromycin  potassium chloride?(Nausea)  zinc sulfate?(Nausea)  Social History  Alcohol - Denies Alcohol Use, 08/03/2013  Past, Liquor, Several times per day, 06/01/2018  Nutrition/Health  Regular, 04/20/2015  Substance Abuse - Denies Substance Abuse, 08/03/2013  Never, 07/25/2017  Tobacco - High Risk, 08/03/2013  10 or more cigarettes (1/2 pack or more)/day in last 30 days, No, 03/06/2019  10 or more cigarettes (1/2 pack or more)/day in last 30 days, No, 02/05/2019  Not obtained due to cognitive impairment, N/A, 01/23/2019  Family History  Diabetes mellitus type 1.: Negative: Father.  Diabetes mellitus type 2: Father.  Hypertension.: Mother.  Immunizations  Vaccine Date Status   influenza virus vaccine, inactivated 10/05/2018 Given   influenza virus vaccine, inactivated 09/27/2017 Given   influenza virus vaccine, inactivated 10/04/2016 Given   tetanus/diphtheria/pertussis, acel(Tdap) 09/09/2016 Given   influenza virus vaccine, inactivated 11/02/2015 Given   Health Maintenance  Health Maintenance  ???Pending?(in the next year)  ??? ??OverDue  ??? ? ? ?Diabetes Screening due??and every?  ??? ? ? ?ADL Screening due??11/14/18??and every 1??year(s)  ??? ??Due?  ??? ? ? ?Alcohol Misuse Screening due??03/06/19??and every 1??year(s)  ??? ? ? ?Colorectal Screening due??03/06/19??and every?  ??? ? ? ?Hypertension Management-Education due??03/06/19??and every 1??year(s)  ??? ??Due In Future?  ??? ? ? ?Smoking Cessation not  due until??08/08/19??and every 1??year(s)  ??? ? ? ?Hypertension Management-BMP not due until??01/25/20??and every 1??year(s)  ??? ? ? ?Aspirin Therapy for CVD Prevention not due until??01/25/20??and every 1??year(s)  ??? ? ? ?Blood Pressure Screening not due until??03/05/20??and every 1??year(s)  ??? ? ? ?Body Mass Index Check not due until??03/05/20??and every 1??year(s)  ??? ? ? ?Depression Screening not due until??03/05/20??and every 1??year(s)  ??? ? ? ?Hypertension Management-Blood Pressure not due until??03/05/20??and every 1??year(s)  ???Satisfied?(in the past 1 year)  ??? ??Satisfied?  ??? ? ? ?Aspirin Therapy for CVD Prevention on??01/25/19.??Satisfied by Carlos Enrique SPEARS-Sammie ALBA  ??? ? ? ?Blood Pressure Screening on??03/06/19.??Satisfied by Flores Baeza  ??? ? ? ?Body Mass Index Check on??03/06/19.??Satisfied by Flores Baeza  ??? ? ? ?Breast Cancer Screening on??09/20/18.??Satisfied by Xi Cuadra  ??? ? ? ?Cervical Cancer Screening on??05/16/18.??Satisfied by Sridevi Felipe  ??? ? ? ?Depression Screening on??03/06/19.??Satisfied by Flores Baeza  ??? ? ? ?Diabetes Screening on??01/25/19.??Satisfied by Elsy Garcia  ??? ? ? ?Hypertension Management-Blood Pressure on??03/06/19.??Satisfied by Flores Baeza  ??? ? ? ?Influenza Vaccine on??01/23/19.??Satisfied by Letitia Villagomez RN  ??? ? ? ?Lipid Screening on??05/07/18.??Satisfied by Yadi Calles  ??? ? ? ?Obesity Screening on??03/06/19.??Satisfied by Flores Baeza  ??? ? ? ?Smoking Cessation on??08/08/18.??Satisfied by Romina Baugh LPN  ?  ?  Diagnostic Results  X-ray right tibia fibula shows acceptable alignment, intact hardware, evidence of interval consolidation noted      Patient seen and examined  Agree with above

## 2022-12-22 ENCOUNTER — DOCUMENTATION ONLY (OUTPATIENT)
Dept: INTERNAL MEDICINE | Facility: CLINIC | Age: 62
End: 2022-12-22
Payer: MEDICARE

## 2022-12-30 ENCOUNTER — DOCUMENTATION ONLY (OUTPATIENT)
Dept: PRIMARY CARE CLINIC | Facility: CLINIC | Age: 62
End: 2022-12-30
Payer: MEDICARE

## 2023-02-28 ENCOUNTER — DOCUMENTATION ONLY (OUTPATIENT)
Dept: ADMINISTRATIVE | Facility: HOSPITAL | Age: 63
End: 2023-02-28
Payer: MEDICARE